# Patient Record
Sex: FEMALE
[De-identification: names, ages, dates, MRNs, and addresses within clinical notes are randomized per-mention and may not be internally consistent; named-entity substitution may affect disease eponyms.]

---

## 2019-03-21 ENCOUNTER — HOSPITAL ENCOUNTER (OUTPATIENT)
Dept: HOSPITAL 31 - C.ER | Age: 74
Discharge: HOME | End: 2019-03-21
Attending: INTERNAL MEDICINE
Payer: MEDICARE

## 2019-03-21 VITALS
DIASTOLIC BLOOD PRESSURE: 62 MMHG | TEMPERATURE: 97.7 F | OXYGEN SATURATION: 99 % | HEART RATE: 76 BPM | SYSTOLIC BLOOD PRESSURE: 153 MMHG | RESPIRATION RATE: 14 BRPM

## 2019-03-21 VITALS — BODY MASS INDEX: 28.3 KG/M2

## 2019-03-21 DIAGNOSIS — K29.00: ICD-10-CM

## 2019-03-21 DIAGNOSIS — E78.00: ICD-10-CM

## 2019-03-21 DIAGNOSIS — R07.0: ICD-10-CM

## 2019-03-21 DIAGNOSIS — K44.9: ICD-10-CM

## 2019-03-21 DIAGNOSIS — Z90.710: ICD-10-CM

## 2019-03-21 DIAGNOSIS — B96.81: ICD-10-CM

## 2019-03-21 DIAGNOSIS — E11.9: ICD-10-CM

## 2019-03-21 DIAGNOSIS — Z82.49: ICD-10-CM

## 2019-03-21 DIAGNOSIS — Z79.84: ICD-10-CM

## 2019-03-21 DIAGNOSIS — Z83.3: ICD-10-CM

## 2019-03-21 DIAGNOSIS — K21.0: Primary | ICD-10-CM

## 2019-03-21 DIAGNOSIS — Z82.0: ICD-10-CM

## 2019-03-21 DIAGNOSIS — I10: ICD-10-CM

## 2019-03-21 LAB
ALBUMIN SERPL-MCNC: 4.4 G/DL (ref 3.5–5)
ALBUMIN/GLOB SERPL: 1.7 {RATIO} (ref 1–2.1)
ALT SERPL-CCNC: 14 U/L (ref 9–52)
APTT BLD: 35 SECONDS (ref 21–34)
AST SERPL-CCNC: 24 U/L (ref 14–36)
BASOPHILS # BLD AUTO: 0.1 K/UL (ref 0–0.2)
BASOPHILS NFR BLD: 0.7 % (ref 0–2)
BUN SERPL-MCNC: 13 MG/DL (ref 7–17)
CALCIUM SERPL-MCNC: 10.1 MG/DL (ref 8.6–10.4)
EOSINOPHIL # BLD AUTO: 0.3 K/UL (ref 0–0.7)
EOSINOPHIL NFR BLD: 4.1 % (ref 0–4)
ERYTHROCYTE [DISTWIDTH] IN BLOOD BY AUTOMATED COUNT: 13.7 % (ref 11.5–14.5)
GFR NON-AFRICAN AMERICAN: > 60
HGB BLD-MCNC: 11.9 G/DL (ref 11–16)
INR PPP: 1
LYMPHOCYTES # BLD AUTO: 1.7 K/UL (ref 1–4.3)
LYMPHOCYTES NFR BLD AUTO: 24.1 % (ref 20–40)
MCH RBC QN AUTO: 30.5 PG (ref 27–31)
MCHC RBC AUTO-ENTMCNC: 33 G/DL (ref 33–37)
MCV RBC AUTO: 92.3 FL (ref 81–99)
MONOCYTES # BLD: 0.5 K/UL (ref 0–0.8)
MONOCYTES NFR BLD: 7.1 % (ref 0–10)
NEUTROPHILS # BLD: 4.6 K/UL (ref 1.8–7)
NEUTROPHILS NFR BLD AUTO: 64 % (ref 50–75)
NRBC BLD AUTO-RTO: 0 % (ref 0–2)
PLATELET # BLD: 207 K/UL (ref 130–400)
PMV BLD AUTO: 8.2 FL (ref 7.2–11.7)
PROTHROMBIN TIME: 11.2 SECONDS (ref 9.7–12.2)
RBC # BLD AUTO: 3.91 MIL/UL (ref 3.8–5.2)
WBC # BLD AUTO: 7.2 K/UL (ref 4.8–10.8)

## 2019-03-21 PROCEDURE — 80053 COMPREHEN METABOLIC PANEL: CPT

## 2019-03-21 PROCEDURE — 43239 EGD BIOPSY SINGLE/MULTIPLE: CPT

## 2019-03-21 PROCEDURE — 88305 TISSUE EXAM BY PATHOLOGIST: CPT

## 2019-03-21 PROCEDURE — 71045 X-RAY EXAM CHEST 1 VIEW: CPT

## 2019-03-21 PROCEDURE — 88342 IMHCHEM/IMCYTCHM 1ST ANTB: CPT

## 2019-03-21 PROCEDURE — 82948 REAGENT STRIP/BLOOD GLUCOSE: CPT

## 2019-03-21 PROCEDURE — 70490 CT SOFT TISSUE NECK W/O DYE: CPT

## 2019-03-21 PROCEDURE — 88312 SPECIAL STAINS GROUP 1: CPT

## 2019-03-21 PROCEDURE — 36415 COLL VENOUS BLD VENIPUNCTURE: CPT

## 2019-03-21 PROCEDURE — 85025 COMPLETE CBC W/AUTO DIFF WBC: CPT

## 2019-03-21 PROCEDURE — 88313 SPECIAL STAINS GROUP 2: CPT

## 2019-03-21 PROCEDURE — 85610 PROTHROMBIN TIME: CPT

## 2019-03-21 PROCEDURE — 85730 THROMBOPLASTIN TIME PARTIAL: CPT

## 2019-03-21 NOTE — CP.PCM.PN
Subjective





- Date & Time of Evaluation


Date of Evaluation: 03/21/19


Time of Evaluation: 17:55





- Subjective


Subjective: 





Brief EGD NOte:





No foreign body or inflammatory reaction anywhere in the upper esophagus.


Reflux esophagitis and small hiatal hernia


Pre-pyloric gastritis with superficial erosions





Check biopsies


PPI for 14 days po


ENT follow up as out patient if symptoms persist. 


No relation to fish bone ingestion (no inflammation noted six days after 

ingestion would be highly unlikely)


Stable for discharge this evening if able to tolerate soft diet. 








Objective





- Vital Signs/Intake and Output


Vital Signs (last 24 hours): 


                                        











Temp Pulse Resp BP Pulse Ox


 


 97.7 F   77   18   146/78   100 


 


 03/21/19 16:17  03/21/19 17:25  03/21/19 17:25  03/21/19 17:25  03/21/19 17:25











- Medications


Medications: 


                               Current Medications





Amlodipine Besylate (Norvasc)  10 mg PO DAILY [facility]


Aspirin (Aspirin Chewable)  81 mg PO DAILY [facility]


Dextrose (Dextrose 50% Inj)  50 ml IV STAT PRN; Protocol


   PRN Reason: Hypoglycemia Protocol


Dextrose (Glutose 15)  0 gm PO ONCE PRN; Protocol


   PRN Reason: Hypoglycemia Protocol


Glucagon (Glucagen Diagnostic Kit)  0 mg IM STAT PRN; Protocol


   PRN Reason: Hypoglycemia Protocol


Dextrose (Dextrose 5% In Water 1000 Ml)  1,000 mls @ 0 mls/hr IV .Q0M PRN; 

Protocol


   PRN Reason: Hypoglycemia Protocol


Insulin Human Regular (Novolin R)  0 unit SC Q6H [facility]; Protocol


   Last Admin: 03/21/19 17:22 Dose:  Not Given


Metformin HCl (Glucophage)  850 mg PO BIDCC [facility]











- Labs


Labs: 


                                        





                                 03/21/19 14:33 





                                 03/21/19 14:33 





                                        











PT  11.2 SECONDS (9.7-12.2)   03/21/19  14:33    


 


INR  1.0   03/21/19  14:33    


 


APTT  35 SECONDS (21-34)  H  03/21/19  14:33

## 2019-03-21 NOTE — RAD
Date of service: 



03/21/2019



PROCEDURE:  CHEST RADIOGRAPH, 1 VIEW



HISTORY:

SOB



COMPARISON:

8/7/2013



FINDINGS:



LUNGS:

Clear.



PLEURA:

No pneumothorax or pleural fluid seen.



CARDIOVASCULAR:

 There is atherosclerotic calcification of the thoracic aorta.  

Normal heart size.  No congestive change.  Please note that there is 

no radiopaque foreign body seen along the tracheal bronchial tree.



OSSEOUS STRUCTURES:

No significant abnormalities.



VISUALIZED UPPER ABDOMEN:

Normal.



OTHER FINDINGS:

None. 



IMPRESSION:

No radiopaque foreign body identified.

## 2019-03-21 NOTE — CP.PCM.HP
History of Present Illness





- History of Present Illness


History of Present Illness: 





HPI: Patient is a 74 year old female with PMH of Diabetes and HTN presenting 

with throat pain. Patient was eating fish on Friday night (3/15/19) and felt 

like a fish bone got stuck in her throat. Patient tried eating more food to try 

and help push it down with little relief. Patient said she is still able to 

eat/swallow but the discomfort and pain has been getting worse. Patient feels 

like something cut her and there is a burning and poking sensation. Patient saw 

Dr. Li yesterday who referred her to see Dr. Behin. Dr. Behin saw patient 

today and did not find anything on exam and believes that the bone is lower that

the laryngoscope could see. Patient had tea this morning and her medications. 

Patient has not eaten anything today. 


Patient denies headache, chest pain, shortness of breath, abdominal pain, 

nausea, vomiting, constipation, or diarrhea. 





Cardio: Dr. Carrasco


All:NKDA


PMHx: DMII, HTN


Family History- Mom:  of MI at 84, Dad: DMII, Parkinson's, Alzheimer's


Social- denies tobacco, alcohol, illicit drug use, lives with 


Surgical History- hysterectomy 2013, R breast cyst drainage/removal 2018











Present on Admission





- Present on Admission


Any Indicators Present on Admission: No


History of DVT/PE: No


History of Uncontrolled Diabetes: No


Urinary Catheter: No


Decubitus Ulcer Present: No





Review of Systems





- Constitutional


Constitutional: absent: Fever, Headache





- EENT


Nose/Mouth/Throat: Odynophagia, Sore Throat.  absent: Hoarsness





- Cardiovascular


Cardiovascular: absent: Chest Pain, Dyspnea





- Respiratory


Respiratory: absent: Cough, Stridor





- Gastrointestinal


Gastrointestinal: absent: Abdominal Pain, Nausea, Vomiting





- Integumentary


Integumentary: absent: Rash





- Neurological


Neurological: absent: Dizziness





Past Patient History





- Past Social History


Smoking Status: Never Smoked





- CARDIAC


Hx Hypercholesterolemia: Yes


Hx Hypertension: Yes





- ENDOCRINE/METABOLIC


Hx Diabetes Mellitus Type 2: Yes





- PSYCHIATRIC


Hx Substance Use: No





- SURGICAL HISTORY


Hx Hysterectomy: Yes (Partial)





- ANESTHESIA


Hx Anesthesia: Yes


Hx Anesthesia Reactions: No





Meds


Allergies/Adverse Reactions: 


                                    Allergies











Allergy/AdvReac Type Severity Reaction Status Date / Time


 


No Known Allergies Allergy   Verified 19 11:59














Physical Exam





- Constitutional


Appears: Non-toxic, No Acute Distress





- Head Exam


Head Exam: ATRAUMATIC, NORMAL INSPECTION, NORMOCEPHALIC





- Eye Exam


Eye Exam: EOMI, Normal appearance





- ENT Exam


ENT Exam: Mucous Membranes Moist





- Neck Exam


Neck exam: Positive for: Normal Inspection





- Respiratory Exam


Respiratory Exam: Clear to Auscultation Bilateral, NORMAL BREATHING PATTERN.  

absent: Rales, Rhonchi, Wheezes, Respiratory Distress, Stridor





- Cardiovascular Exam


Cardiovascular Exam: REGULAR RHYTHM, RRR, +S1, +S2





- GI/Abdominal Exam


GI & Abdominal Exam: Normal Bowel Sounds, Soft





- Extremities Exam


Extremities exam: Positive for: normal inspection.  Negative for: tenderness





- Neurological Exam


Neurological exam: Alert, Oriented x3





- Psychiatric Exam


Psychiatric exam: Normal Affect, Normal Mood





- Skin


Skin Exam: Intact, Normal Color, Warm





Results





- Vital Signs


Recent Vital Signs: 





                                Last Vital Signs











Temp  98.5 F   19 11:55


 


Pulse  92 H  19 11:55


 


Resp  20   19 11:55


 


BP  156/76 H  19 11:55


 


Pulse Ox  99   19 11:55














- Labs


Result Diagrams: 


                                 19 14:33





                                 19 14:33





Assessment & Plan





- Assessment and Plan (Free Text)


Assessment: 





Throat Pain 


2/2 foreign body?


NPO


EGD today for evaluation 


normal laryngoscope today with Dr. Behin





HTN


continue Amlodipine 10mg po daily


ASA 81mg po daily





DMII


Metformin 850mg po BID





Prophylaxis


SCDs








Dispo: possible d/c pending endoscopic evaluation





Discussed with Dr. Li

## 2019-03-21 NOTE — CP.PCM.CON
<Heaven Packer - Last Filed: 03/21/19 15:31>





History of Present Illness





- History of Present Illness


History of Present Illness: 


Gastroenterology Fellow/PGY6 Consult Note for 





74 year old female with PMH of Diabetes and HTN presenting with throat pain. 

Patient describes immediate mid-epigastric throat pain after fish ingestion on 

friday. She attempted to drink water which resulted in approximately fifty-

percent relief. Endorses constant dull throat discomfort since friday at level 

of clavicles. Denies heartburn, acid reflux, chest pain, shortness of breath, 

abdominal pain, diarrhea, constipation, melena, hematochezia, or unintentional 

weight loss. She last ate solid food yesterday for dinner of salad with soup 

containing vegetables and no meat products. Last oral intake today of eight 

ounces of coffee this morning with her home medications. She was referred to ENT

by PCP outpatient with a laryngoscope performed within the last five days that 

was normal. No prior EGD or colonoscopy.





Family History- denies stomach cancer, colon cancer


Social History- denies tobacco, alcohol, illicit drug use


Surgical History- hysterectomy 2013








Review of Systems





- Review of Systems


Review of Systems: 


12-point review of systems negative except for as above








Past Patient History





- Past Social History


Smoking Status: Never Smoked





- CARDIAC


Hx Hypercholesterolemia: Yes


Hx Hypertension: Yes





- ENDOCRINE/METABOLIC


Hx Diabetes Mellitus Type 2: Yes





- PSYCHIATRIC


Hx Substance Use: No





- SURGICAL HISTORY


Hx Hysterectomy: Yes (Partial)





- ANESTHESIA


Hx Anesthesia: Yes


Hx Anesthesia Reactions: No





Meds


Allergies/Adverse Reactions: 


                                    Allergies











Allergy/AdvReac Type Severity Reaction Status Date / Time


 


No Known Allergies Allergy   Verified 03/21/19 11:59














Physical Exam





- Constitutional


Appears: Non-toxic, No Acute Distress





- Head Exam


Head Exam: ATRAUMATIC, NORMOCEPHALIC





- Eye Exam


Eye Exam: EOMI, PERRL.  absent: Scleral icterus


Pupil Exam: PERRL.  absent: Miosis, Mydriatic





- ENT Exam


ENT Exam: Mucous Membranes Moist, Normal Oropharynx





- Neck Exam


Neck exam: Positive for: Full Rom, Normal Inspection





- Respiratory Exam


Respiratory Exam: Clear to Auscultation Bilateral.  absent: Rales, Rhonchi, 

Wheezes





- Cardiovascular Exam


Cardiovascular Exam: RRR, +S1, +S2.  absent: Gallop, Rubs





- GI/Abdominal Exam


GI & Abdominal Exam: Normal Bowel Sounds, Soft.  absent: Distended, Firm, 

Guarding, Organomegaly, Rebound, Rigid, Tenderness





- Extremities Exam


Extremities exam: Positive for: normal inspection.  Negative for: pedal edema





- Neurological Exam


Neurological exam: Alert, Oriented x3





- Psychiatric Exam


Psychiatric exam: Normal Affect, Normal Mood





- Skin


Skin Exam: Dry, Intact, Normal Color, Warm





Results





- Vital Signs


Recent Vital Signs: 


                                Last Vital Signs











Temp  98.5 F   03/21/19 11:55


 


Pulse  92 H  03/21/19 11:55


 


Resp  20   03/21/19 11:55


 


BP  156/76 H  03/21/19 11:55


 


Pulse Ox  99   03/21/19 11:55














- Labs


Result Diagrams: 


                                 03/21/19 14:33





                                 03/21/19 14:33





Assessment & Plan





- Assessment and Plan (Free Text)


Assessment: 


74 year old female with PMH of Diabetes and HTN presenting with throat pain. 

Active treatment of throat pain with concern for foreign body after fish bone 

ingestion seven days ago. No prior EGD or colonoscopy.





Plan: 





-plan for EGD today to evaluate for foreign body


-NPO status


-hemodynamically stable


-last oral intake- eight ounces of coffee this morning with her home medications


-recent outpatient ENT evaluation- normal laryngoscope in last five days


-further recommendations after endoscopic evaluation








<Doc Hill - Last Filed: 03/21/19 17:51>





Meds





- Medications


Medications: 


                               Current Medications





Amlodipine Besylate (Norvasc)  10 mg PO DAILY [facility]


Aspirin (Aspirin Chewable)  81 mg PO DAILY [facility]


Dextrose (Dextrose 50% Inj)  50 ml IV STAT PRN; Protocol


   PRN Reason: Hypoglycemia Protocol


Dextrose (Glutose 15)  0 gm PO ONCE PRN; Protocol


   PRN Reason: Hypoglycemia Protocol


Glucagon (Glucagen Diagnostic Kit)  0 mg IM STAT PRN; Protocol


   PRN Reason: Hypoglycemia Protocol


Dextrose (Dextrose 5% In Water 1000 Ml)  1,000 mls @ 0 mls/hr IV .Q0M PRN; 

Protocol


   PRN Reason: Hypoglycemia Protocol


Insulin Human Regular (Novolin R)  0 unit SC Q6H [facility]; Protocol


   Last Admin: 03/21/19 17:22 Dose:  Not Given


Metformin HCl (Glucophage)  850 mg PO BIDCC [facility]











Results





- Vital Signs


Recent Vital Signs: 


                                Last Vital Signs











Temp  97.7 F   03/21/19 16:17


 


Pulse  74   03/21/19 16:17


 


Resp  20   03/21/19 16:17


 


BP  150/69   03/21/19 16:17


 


Pulse Ox  97   03/21/19 16:35














- Labs


Result Diagrams: 


                                 03/21/19 14:33





                                 03/21/19 14:33


Labs: 


                         Laboratory Results - last 24 hr











  03/21/19 03/21/19 03/21/19





  14:33 14:33 14:33


 


WBC  7.2  


 


RBC  3.91  


 


Hgb  11.9  


 


Hct  36.1  


 


MCV  92.3  D  


 


MCH  30.5  


 


MCHC  33.0  


 


RDW  13.7  


 


Plt Count  207  


 


MPV  8.2  


 


Neut % (Auto)  64.0  


 


Lymph % (Auto)  24.1  


 


Mono % (Auto)  7.1  


 


Eos % (Auto)  4.1 H  


 


Baso % (Auto)  0.7  


 


Neut # (Auto)  4.6  


 


Lymph # (Auto)  1.7  


 


Mono # (Auto)  0.5  


 


Eos # (Auto)  0.3  


 


Baso # (Auto)  0.1  


 


PT   11.2 


 


INR   1.0 


 


APTT   35 H 


 


Sodium    137


 


Potassium    4.2


 


Chloride    102


 


Carbon Dioxide    29


 


Anion Gap    11


 


BUN    13


 


Creatinine    0.7


 


Est GFR ( Amer)    > 60


 


Est GFR (Non-Af Amer)    > 60


 


POC Glucose (mg/dL)   


 


Random Glucose    148 H


 


Calcium    10.1


 


Total Bilirubin    0.3


 


AST    24


 


ALT    14


 


Alkaline Phosphatase    107


 


Total Protein    7.0


 


Albumin    4.4


 


Globulin    2.6


 


Albumin/Globulin Ratio    1.7














  03/21/19





  15:53


 


WBC 


 


RBC 


 


Hgb 


 


Hct 


 


MCV 


 


MCH 


 


MCHC 


 


RDW 


 


Plt Count 


 


MPV 


 


Neut % (Auto) 


 


Lymph % (Auto) 


 


Mono % (Auto) 


 


Eos % (Auto) 


 


Baso % (Auto) 


 


Neut # (Auto) 


 


Lymph # (Auto) 


 


Mono # (Auto) 


 


Eos # (Auto) 


 


Baso # (Auto) 


 


PT 


 


INR 


 


APTT 


 


Sodium 


 


Potassium 


 


Chloride 


 


Carbon Dioxide 


 


Anion Gap 


 


BUN 


 


Creatinine 


 


Est GFR ( Amer) 


 


Est GFR (Non-Af Amer) 


 


POC Glucose (mg/dL)  137 H


 


Random Glucose 


 


Calcium 


 


Total Bilirubin 


 


AST 


 


ALT 


 


Alkaline Phosphatase 


 


Total Protein 


 


Albumin 


 


Globulin 


 


Albumin/Globulin Ratio 














Attending/Attestation





- Attestation


I have personally seen and examined this patient.: Yes


I have fully participated in the care of the patient.: Yes


I have reviewed all pertinent clinical information: Yes


Notes (Text): 





03/21/19 17:49


CT scan of the throat shows no evidence of foreign body or inflammatory reaction

that would be expected after six days if embedded in tissue for this period of 

time. 


Plan for emergency EGD today to assess for foreign body.


IV fluids and PPI.

## 2019-03-21 NOTE — CP.PCM.DIS
<Atul Delatorre E - Last Filed: 03/21/19 15:38>





Provider





- Provider


Date of Admission: 


03/21/19 14:06





Attending physician: 


Farhad Li Jr, MD





Consults: 








03/21/19 14:41


Gastroenterology Consult Routine 


   Comment: esophagus FB


   Consulting Provider: Doc Hill


   Consulting Physician: Doc Hill


   Reason for Consult: esophagus FB














Hospital Course





- Lab Results


Lab Results: 


                             Most Recent Lab Values











WBC  7.2 K/uL (4.8-10.8)   03/21/19  14:33    


 


RBC  3.91 Mil/uL (3.80-5.20)   03/21/19  14:33    


 


Hgb  11.9 g/dL (11.0-16.0)   03/21/19  14:33    


 


Hct  36.1 % (34.0-47.0)   03/21/19  14:33    


 


MCV  92.3 fL (81.0-99.0)  D 03/21/19  14:33    


 


MCH  30.5 pg (27.0-31.0)   03/21/19  14:33    


 


MCHC  33.0 g/dL (33.0-37.0)   03/21/19  14:33    


 


RDW  13.7 % (11.5-14.5)   03/21/19  14:33    


 


Plt Count  207 K/uL (130-400)   03/21/19  14:33    


 


MPV  8.2 fL (7.2-11.7)   03/21/19  14:33    


 


Neut % (Auto)  64.0 % (50.0-75.0)   03/21/19  14:33    


 


Lymph % (Auto)  24.1 % (20.0-40.0)   03/21/19  14:33    


 


Mono % (Auto)  7.1 % (0.0-10.0)   03/21/19  14:33    


 


Eos % (Auto)  4.1 % (0.0-4.0)  H  03/21/19  14:33    


 


Baso % (Auto)  0.7 % (0.0-2.0)   03/21/19  14:33    


 


Neut # (Auto)  4.6 K/uL (1.8-7.0)   03/21/19  14:33    


 


Lymph # (Auto)  1.7 K/uL (1.0-4.3)   03/21/19  14:33    


 


Mono # (Auto)  0.5 K/uL (0.0-0.8)   03/21/19  14:33    


 


Eos # (Auto)  0.3 K/uL (0.0-0.7)   03/21/19  14:33    


 


Baso # (Auto)  0.1 K/uL (0.0-0.2)   03/21/19  14:33    


 


PT  11.2 SECONDS (9.7-12.2)   03/21/19  14:33    


 


INR  1.0   03/21/19  14:33    


 


APTT  35 SECONDS (21-34)  H  03/21/19  14:33    


 


Sodium  137 mmol/L (132-148)   03/21/19  14:33    


 


Potassium  4.2 mmol/L (3.6-5.2)   03/21/19  14:33    


 


Chloride  102 mmol/L ()   03/21/19  14:33    


 


Carbon Dioxide  29 mmol/L (22-30)   03/21/19  14:33    


 


Anion Gap  11  (10-20)   03/21/19  14:33    


 


BUN  13 mg/dL (7-17)   03/21/19  14:33    


 


Creatinine  0.7 mg/dL (0.7-1.2)   03/21/19  14:33    


 


Est GFR ( Amer)  > 60   03/21/19  14:33    


 


Est GFR (Non-Af Amer)  > 60   03/21/19  14:33    


 


Random Glucose  148 mg/dL ()  H  03/21/19  14:33    


 


Calcium  10.1 mg/dl (8.6-10.4)   03/21/19  14:33    


 


Total Bilirubin  0.3 mg/dL (0.2-1.3)   03/21/19  14:33    


 


AST  24 U/L (14-36)   03/21/19  14:33    


 


ALT  14 U/L (9-52)   03/21/19  14:33    


 


Alkaline Phosphatase  107 U/L ()   03/21/19  14:33    


 


Total Protein  7.0 g/dL (6.3-8.3)   03/21/19  14:33    


 


Albumin  4.4 g/dL (3.5-5.0)   03/21/19  14:33    


 


Globulin  2.6 gm/dL (2.2-3.9)   03/21/19  14:33    


 


Albumin/Globulin Ratio  1.7  (1.0-2.1)   03/21/19  14:33    














Discharge Plan





- Discharge Medications


Prescriptions: 


Famotidine [Pepcid] 20 mg PO DAILY #7 tab





- Follow Up Plan


Condition: GOOD


Disposition: HOME/ ROUTINE


Instructions:  Foreign Body, Swallowed, Adult (DC)


Additional Instructions: 


Patient had endoscopy performed by Dr. Hill


fishbone was removed from esophagus





Patient stable for discharge. 





Please follow up with Dr Hill in the office





If symptoms return, please seek medical attention


take care and be well


Referrals: 


Doc Hill MD [Staff Provider] - 





<Natalya Zuleta - Last Filed: 03/22/19 15:56>





Provider





- Provider


Date of Admission: 


03/21/19 14:06





Attending physician: 


Farhad Li Jr, MD





Consults: 








03/21/19 14:41


Gastroenterology Consult Routine 


   Comment: esophagus FB


   Consulting Provider: Doc Hill


   Consulting Physician: Doc Hill


   Reason for Consult: esophagus FB











Time Spent in preparation of Discharge (in minutes): 35





Diagnosis





- Discharge Diagnosis


(1) Reflux esophagitis


Status: Acute   





(2) Hiatal hernia


Status: Acute   





Hospital Course





- Lab Results


Lab Results: 


                             Most Recent Lab Values











WBC  7.2 K/uL (4.8-10.8)   03/21/19  14:33    


 


RBC  3.91 Mil/uL (3.80-5.20)   03/21/19  14:33    


 


Hgb  11.9 g/dL (11.0-16.0)   03/21/19  14:33    


 


Hct  36.1 % (34.0-47.0)   03/21/19  14:33    


 


MCV  92.3 fL (81.0-99.0)  D 03/21/19  14:33    


 


MCH  30.5 pg (27.0-31.0)   03/21/19  14:33    


 


MCHC  33.0 g/dL (33.0-37.0)   03/21/19  14:33    


 


RDW  13.7 % (11.5-14.5)   03/21/19  14:33    


 


Plt Count  207 K/uL (130-400)   03/21/19  14:33    


 


MPV  8.2 fL (7.2-11.7)   03/21/19  14:33    


 


Neut % (Auto)  64.0 % (50.0-75.0)   03/21/19  14:33    


 


Lymph % (Auto)  24.1 % (20.0-40.0)   03/21/19  14:33    


 


Mono % (Auto)  7.1 % (0.0-10.0)   03/21/19  14:33    


 


Eos % (Auto)  4.1 % (0.0-4.0)  H  03/21/19  14:33    


 


Baso % (Auto)  0.7 % (0.0-2.0)   03/21/19  14:33    


 


Neut # (Auto)  4.6 K/uL (1.8-7.0)   03/21/19  14:33    


 


Lymph # (Auto)  1.7 K/uL (1.0-4.3)   03/21/19  14:33    


 


Mono # (Auto)  0.5 K/uL (0.0-0.8)   03/21/19  14:33    


 


Eos # (Auto)  0.3 K/uL (0.0-0.7)   03/21/19  14:33    


 


Baso # (Auto)  0.1 K/uL (0.0-0.2)   03/21/19  14:33    


 


PT  11.2 SECONDS (9.7-12.2)   03/21/19  14:33    


 


INR  1.0   03/21/19  14:33    


 


APTT  35 SECONDS (21-34)  H  03/21/19  14:33    


 


Sodium  137 mmol/L (132-148)   03/21/19  14:33    


 


Potassium  4.2 mmol/L (3.6-5.2)   03/21/19  14:33    


 


Chloride  102 mmol/L ()   03/21/19  14:33    


 


Carbon Dioxide  29 mmol/L (22-30)   03/21/19  14:33    


 


Anion Gap  11  (10-20)   03/21/19  14:33    


 


BUN  13 mg/dL (7-17)   03/21/19  14:33    


 


Creatinine  0.7 mg/dL (0.7-1.2)   03/21/19  14:33    


 


Est GFR ( Amer)  > 60   03/21/19  14:33    


 


Est GFR (Non-Af Amer)  > 60   03/21/19  14:33    


 


Random Glucose  148 mg/dL ()  H  03/21/19  14:33    


 


Calcium  10.1 mg/dl (8.6-10.4)   03/21/19  14:33    


 


Total Bilirubin  0.3 mg/dL (0.2-1.3)   03/21/19  14:33    


 


AST  24 U/L (14-36)   03/21/19  14:33    


 


ALT  14 U/L (9-52)   03/21/19  14:33    


 


Alkaline Phosphatase  107 U/L ()   03/21/19  14:33    


 


Total Protein  7.0 g/dL (6.3-8.3)   03/21/19  14:33    


 


Albumin  4.4 g/dL (3.5-5.0)   03/21/19  14:33    


 


Globulin  2.6 gm/dL (2.2-3.9)   03/21/19  14:33    


 


Albumin/Globulin Ratio  1.7  (1.0-2.1)   03/21/19  14:33    














- Hospital Course


Hospital Course: 





"HPI: Patient is a 74 year old female with PMH of Diabetes and HTN presenting 

with throat pain. Patient was eating fish on Friday night (3/15/19) and felt li

ke a fish bone got stuck in her throat. Patient tried eating more food to try 

and help push it down with little relief. Patient said she is still able to 

eat/swallow but the discomfort and pain has been getting worse. Patient feels 

like something cut her and there is a burning and poking sensation. Patient saw 

Dr. Li yesterday who referred her to see Dr. Behin. Dr. Behin saw patient 

today and did not find anything on exam and believes that the bone is lower that

the laryngoscope could see. Patient had tea this morning and her medications. 

Patient has not eaten anything today. 


Patient denies headache, chest pain, shortness of breath, abdominal pain, 

nausea, vomiting, constipation, or diarrhea."





Patient had endoscopy performed by Dr. Hill. No foreign body or inflammatory 

reaction seen. Reflux esophagitis and small hiatal hernia seen. 


Pre-pyloric gastritis with superficial erosions and biopsy done to rule out H 

pylori. 





Patient stable for discharge. Patient to follow up with Dr. Li as an 

outpatient and to follow up with GI in 2 weeks if symptoms persist. 





This is a summary of the patient's hospital course, please see chart for full 

details. 





Discharge Exam





- Head Exam


Head Exam: ATRAUMATIC, NORMAL INSPECTION, NORMOCEPHALIC





- Eye Exam


Eye Exam: EOMI, Normal appearance





- ENT Exam


ENT Exam: Mucous Membranes Moist





- Neck Exam


Neck exam: Normal Inspection





- Respiratory Exam


Respiratory Exam: Clear to PA & Lateral, NORMAL BREATHING PATTERN, UNREMARKABLE.

 absent: Rhonchi, Wheezes, Respiratory Distress, Stridor





- Cardiovascular Exam


Cardiovascular Exam: REGULAR RHYTHM, RRR, +S1, +S2





- GI/Abdominal Exam


GI & Abdominal Exam: Normal Bowel Sounds, Soft.  absent: Tenderness





- Extremities Exam


Extremities exam: normal inspection





- Neurological Exam


Neurological exam: Alert, Oriented x3





- Psychiatric Exam


Psychiatric exam: Normal Affect, Normal Mood





- Skin


Skin Exam: Intact, Normal Color, Warm

## 2019-03-21 NOTE — CT
Date of service: 



03/21/2019



PROCEDURE:  CT NECK WITHOUT  CONTRAST



HISTORY:

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



COMPARISON:

No prior study available for comparison.



TECHNIQUE:

CT of the neck without intravenous contrast. Coronal and sagittal 

reformats generated. 



Radiation dose:



Total exam DLP = 327.87 mGy-cm.



This CT exam was performed using one or more of the following dose 

reduction techniques: Automated exposure control, adjustment of the 

mA and/or kV according to patient size, and/or use of iterative 

reconstruction technique.



FINDINGS:



NASOPHARYNX:

Unremarkable.



SUPRAHYOID NECK:

Unremarkable oropharynx, oral cavity, parapharyngeal space and 

retropharyngeal space.



INFRAHYOID NECK:

Unremarkable larynx, hypopharynx, and supraglottic space. Vocal cords 

intact.



MASS:

None.



GLANDS:

Parotid and submandibular glands unremarkable.  There is a small 

calcification right lobe thyroid gland.  There is a large 

low-attenuation lesion left lobe thyroid gland for which thyroid 

ultrasound follow-up recommended.  



LYMPH NODES:

Normal. No lymphadenopathy.



CERVICAL SPINE:

Mild multilevel degenerative spondylosis of the thoracic spine. 



OTHER FINDINGS:

Mild calcified atherosclerotic plaque changes both carotid 

bifurcations.



No radiopaque foreign bodies are identified.



IMPRESSION:

There are no radiopaque foreign bodies identified.. 



Small calcification right lobe thyroid gland with low-attenuation 

lesion left lobe of the thyroid gland.  Recommend follow-up thyroid 

ultrasound.

## 2019-03-22 ENCOUNTER — HOSPITAL ENCOUNTER (EMERGENCY)
Dept: HOSPITAL 14 - H.ER | Age: 74
Discharge: HOME | End: 2019-03-22
Payer: MEDICARE

## 2019-03-22 VITALS
RESPIRATION RATE: 18 BRPM | SYSTOLIC BLOOD PRESSURE: 134 MMHG | HEART RATE: 87 BPM | TEMPERATURE: 98.6 F | OXYGEN SATURATION: 96 % | DIASTOLIC BLOOD PRESSURE: 68 MMHG

## 2019-03-22 VITALS — BODY MASS INDEX: 28.3 KG/M2

## 2019-03-22 DIAGNOSIS — J02.9: Primary | ICD-10-CM

## 2019-03-22 DIAGNOSIS — Z79.84: ICD-10-CM

## 2019-03-22 DIAGNOSIS — E11.9: ICD-10-CM

## 2019-03-22 NOTE — ED PDOC
HPI: General Adult


Time Seen by Provider: 03/22/19 17:59


Chief Complaint (Nursing): ENT Problem


Chief Complaint (Provider): Sore throat


History Per: Patient


History/Exam Limitations: no limitations


Onset/Duration Of Symptoms: Days (9)


Current Symptoms Are (Timing): Still Present


Additional Complaint(s): 


74 year old female presents to the ED for an evaluation of sore throat.  Patient

reports of throat discomfort onset for 9 days after eating a fish and possibly 

swallowing a fish bone. She states that the foreign body sensation and 

discomfort was persistent and she followed up with her PMD Dr. Galvan the 

following day and he referred her to ENT.   She visited Dr. Behin 3 days ago and

had a laryngoscopy performed in the office with no foreign body visualized and 

patient was referred to the ED for a CT scan. She was seen at [facility] 

yesterday and had a CT scan done which as negative and GI consult for EGD which 

was also negative.  Pt. reports persistent discomfort while swallowing, no 

fevers.  She is tolerating po.








Past Medical History


Reviewed: Historical Data, Nursing Documentation, Vital Signs


Vital Signs: 





                                Last Vital Signs











Temp  98.6 F   03/22/19 17:48


 


Pulse  87   03/22/19 17:48


 


Resp  18   03/22/19 17:48


 


BP  134/68   03/22/19 17:48


 


Pulse Ox  96   03/22/19 17:48














- Medical History


PMH: Diabetes, HTN, Hypercholesterolemia





- Family History


Family History: States: Unknown Family Hx





- Immunization History


Hx Tetanus Toxoid Vaccination: No


Hx Influenza Vaccination: Yes


Hx Pneumococcal Vaccination: Yes





- Home Medications


Home Medications: 


                                Ambulatory Orders











 Medication  Instructions  Recorded


 


Aspirin 81 mg PO DAILY 03/21/19


 


Famotidine [Pepcid] 20 mg PO DAILY #7 tab 03/21/19


 


amLODIPine [Norvasc] 10 mg PO DAILY 03/21/19


 


metFORMIN [glucOPHAGE] 850 mg PO BID 03/21/19


 


Sucralfate [Carafate] 1 gm PO BID #100 ml 03/22/19














- Allergies


Allergies/Adverse Reactions: 


                                    Allergies











Allergy/AdvReac Type Severity Reaction Status Date / Time


 


No Known Allergies Allergy   Verified 03/22/19 17:48














Review of Systems


ROS Statement: Except As Marked, All Systems Reviewed And Found Negative


ENT: Positive for: Throat Pain.  Negative for: Mouth Swelling, Throat Swelling


Musculoskeletal: Negative for: Neck Pain


Skin: Negative for: Rash





Physical Exam





- Reviewed


Nursing Documentation Reviewed: Yes


Vital Signs Reviewed: Yes





- Physical Exam


Appears: Positive for: Non-toxic, No Acute Distress


Head Exam: Positive for: ATRAUMATIC, NORMAL INSPECTION, NORMOCEPHALIC


Skin: Positive for: Normal Color, Warm, DRY


ENT: Positive for: Normal ENT Inspection (no sublingual swelling or elevation of

 tongue ), Pharyngeal Erythema (mild ), Other (no uvula enlargement, uvula 

midline, no trismus, no sublingual swelling, no elevation of tongue  )


Neck: Positive for: Normal (no swelling, erythema or tenderness to palpation 

over anterior neck or submandibular areas), Painless ROM ( k )


Cardiovascular/Chest: Positive for: Regular Rate, Rhythm.  Negative for: Murmur


Respiratory: Positive for: Normal Breath Sounds.  Negative for: Decreased Breath

 Sounds, Respiratory Distress


Neurological/Psych: Positive for: Awake, Alert, Normal Tone, Oriented (x3)





- ECG


O2 Sat by Pulse Oximetry: 96 (RA)


Pulse Ox Interpretation: Normal





Medical Decision Making


Medical Decision Making: 


Time: 1814


Plan: 


Lidocaine 2% 10ml PO





IMAGING FROM [facility] YESTERDAY.


Date of service: 03/21/2019


PROCEDURE:  CT NECK WITHOUT  CONTRAST


HISTORY:


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


COMPARISON:


No prior study available for comparison.


TECHNIQUE:


CT of the neck without intravenous contrast. Coronal and sagittal reformats 

generated. 


Radiation dose:


Total exam DLP = 327.87 mGy-cm.


This CT exam was performed using one or more of the following dose reduction 

techniques: Automated exposure control, adjustment of the mA and/or kV according

 to patient size, and/or use of iterative reconstruction technique.


FINDINGS:


NASOPHARYNX:


Unremarkable.


SUPRAHYOID NECK:


Unremarkable oropharynx, oral cavity, parapharyngeal space and retropharyngeal 

space.


INFRAHYOID NECK:


Unremarkable larynx, hypopharynx, and supraglottic space. Vocal cords intact.


MASS:


None.


GLANDS:


Parotid and submandibular glands unremarkable.  There is a small calcification 

right lobe thyroid gland.  There is a large low-attenuation lesion left lobe 

thyroid gland for which thyroid ultrasound follow-up recommended.  


LYMPH NODES:


Normal. No lymphadenopathy.


CERVICAL SPINE:


Mild multilevel degenerative spondylosis of the thoracic spine. 


OTHER FINDINGS:


Mild calcified atherosclerotic plaque changes both carotid bifurcations.


No radiopaque foreign bodies are identified.


IMPRESSION:


There are no radiopaque foreign bodies identified.. 


Small calcification right lobe thyroid gland with low-attenuation lesion left 

lobe of the thyroid gland.  Recommend follow-up thyroid ultrasound.





Date of service: 03/21/2019


PROCEDURE:  CHEST RADIOGRAPH, 1 VIEW


HISTORY:


SOB


COMPARISON:


8/7/2013


FINDINGS:


LUNGS:


Clear.


PLEURA:


No pneumothorax or pleural fluid seen.


CARDIOVASCULAR:


 There is atherosclerotic calcification of the thoracic aorta.  Normal heart 

size.  No congestive change.  Please note that there is no radiopaque foreign 

body seen along the tracheal bronchial tree.


OSSEOUS STRUCTURES:


No significant abnormalities.


VISUALIZED UPPER ABDOMEN:


Normal.


OTHER FINDINGS:


None. 


IMPRESSION:


No radiopaque foreign body identified.








On reassessment, pt. reports feeling much better, she is tolerating po.  





 

--------------------------------------------------------------------------------


-----------------------


Scribe Attestation:


Documented by Demetrice Ojeda acting as a scribe for Rashida Gonzalez PA-C.





Provider Scribe Attestation: 


All medical record entries made by the Scribe were at my direction and 

personally dictated by me. I have reviewed the chart and agree that the record 

accurately reflects my personal performance of the history, physical exam, 

medical decision making, and the department course for this patient. I have also

 personally directed, reviewed, and agree with the discharge instructions and 

disposition.











Disposition





- Clinical Impression


Clinical Impression: 


 Ear, nose, and throat symptom








- Patient ED Disposition


Is Patient to be Admitted: No





- Disposition


Referrals: 


Edy Acosta Jr., MD [Family Provider] - 


Disposition: Routine/Home


Disposition Time: 19:17


Condition: STABLE


Prescriptions: 


Sucralfate [Carafate] 1 gm PO BID #100 ml


Instructions:  Sore Throat, Adult (DC)


Forms:  [employer] (English), [employer] (Romansh)


Print Language: Citizen of [place]

## 2019-03-29 ENCOUNTER — HOSPITAL ENCOUNTER (OUTPATIENT)
Dept: HOSPITAL 31 - C.ER | Age: 74
Setting detail: OBSERVATION
LOS: 2 days | Discharge: HOME | End: 2019-03-31
Attending: INTERNAL MEDICINE | Admitting: INTERNAL MEDICINE
Payer: MEDICARE

## 2019-03-29 VITALS — RESPIRATION RATE: 20 BRPM

## 2019-03-29 VITALS — BODY MASS INDEX: 28.3 KG/M2

## 2019-03-29 DIAGNOSIS — J10.1: Primary | ICD-10-CM

## 2019-03-29 DIAGNOSIS — G20: ICD-10-CM

## 2019-03-29 DIAGNOSIS — I10: ICD-10-CM

## 2019-03-29 DIAGNOSIS — E11.9: ICD-10-CM

## 2019-03-29 DIAGNOSIS — N39.0: ICD-10-CM

## 2019-03-29 DIAGNOSIS — F02.80: ICD-10-CM

## 2019-03-29 LAB
ALBUMIN SERPL-MCNC: 4.5 G/DL (ref 3.5–5)
ALBUMIN/GLOB SERPL: 1.7 {RATIO} (ref 1–2.1)
ALT SERPL-CCNC: 17 U/L (ref 9–52)
AST SERPL-CCNC: 32 U/L (ref 14–36)
BACTERIA #/AREA URNS HPF: (no result) /[HPF]
BASOPHILS # BLD AUTO: 0 K/UL (ref 0–0.2)
BASOPHILS NFR BLD: 0.7 % (ref 0–2)
BILIRUB UR-MCNC: NEGATIVE MG/DL
BUN SERPL-MCNC: 15 MG/DL (ref 7–17)
CALCIUM SERPL-MCNC: 9.4 MG/DL (ref 8.6–10.4)
EOSINOPHIL # BLD AUTO: 0.1 K/UL (ref 0–0.7)
EOSINOPHIL NFR BLD: 0.9 % (ref 0–4)
EOSINOPHIL NFR BLD: 1 % (ref 0–4)
ERYTHROCYTE [DISTWIDTH] IN BLOOD BY AUTOMATED COUNT: 13.6 % (ref 11.5–14.5)
GFR NON-AFRICAN AMERICAN: > 60
GLUCOSE UR STRIP-MCNC: NORMAL MG/DL
HGB BLD-MCNC: 11.9 G/DL (ref 11–16)
LEUKOCYTE ESTERASE UR-ACNC: (no result) LEU/UL
LYMPHOCYTE: 6 % (ref 20–40)
LYMPHOCYTES # BLD AUTO: 0.5 K/UL (ref 1–4.3)
LYMPHOCYTES NFR BLD AUTO: 6.4 % (ref 20–40)
MCH RBC QN AUTO: 31.3 PG (ref 27–31)
MCHC RBC AUTO-ENTMCNC: 34.6 G/DL (ref 33–37)
MCV RBC AUTO: 90.5 FL (ref 81–99)
MONOCYTE: 10 % (ref 0–10)
MONOCYTES # BLD: 0.9 K/UL (ref 0–0.8)
MONOCYTES NFR BLD: 13.1 % (ref 0–10)
NEUTROPHILS # BLD: 5.6 K/UL (ref 1.8–7)
NEUTROPHILS NFR BLD AUTO: 78.9 % (ref 50–75)
NEUTROPHILS NFR BLD AUTO: 82 % (ref 50–75)
NEUTS BAND NFR BLD: 1 % (ref 0–2)
NRBC BLD AUTO-RTO: 0 % (ref 0–2)
OVALOCYTES BLD QL SMEAR: SLIGHT
PH UR STRIP: 5 [PH] (ref 5–8)
PLATELET # BLD EST: NORMAL 10*3/UL
PLATELET # BLD: 224 K/UL (ref 130–400)
PMV BLD AUTO: 7.7 FL (ref 7.2–11.7)
PROT UR STRIP-MCNC: (no result) MG/DL
RBC # BLD AUTO: 3.81 MIL/UL (ref 3.8–5.2)
RBC # UR STRIP: NEGATIVE /UL
SP GR UR STRIP: 1.01 (ref 1–1.03)
SQUAMOUS EPITHIAL: 1 /HPF (ref 0–5)
TOTAL CELLS COUNTED BLD: 100
UROBILINOGEN UR-MCNC: NORMAL MG/DL (ref 0.2–1)
WBC # BLD AUTO: 7.1 K/UL (ref 4.8–10.8)

## 2019-03-29 PROCEDURE — 80053 COMPREHEN METABOLIC PANEL: CPT

## 2019-03-29 PROCEDURE — 85025 COMPLETE CBC W/AUTO DIFF WBC: CPT

## 2019-03-29 PROCEDURE — 81001 URINALYSIS AUTO W/SCOPE: CPT

## 2019-03-29 PROCEDURE — 82948 REAGENT STRIP/BLOOD GLUCOSE: CPT

## 2019-03-29 PROCEDURE — 36415 COLL VENOUS BLD VENIPUNCTURE: CPT

## 2019-03-29 PROCEDURE — 87086 URINE CULTURE/COLONY COUNT: CPT

## 2019-03-29 PROCEDURE — 71046 X-RAY EXAM CHEST 2 VIEWS: CPT

## 2019-03-29 PROCEDURE — 87804 INFLUENZA ASSAY W/OPTIC: CPT

## 2019-03-29 PROCEDURE — 99285 EMERGENCY DEPT VISIT HI MDM: CPT

## 2019-03-29 NOTE — CP.PCM.HP
History of Present Illness





- History of Present Illness


History of Present Illness: 





Patient is a 74 year old female with pmhx of DM2 and HTN who presented to the ED

with complaints of fevers, weakness, coughing, sore throat that began last 

night.  Patient reports symptoms came on abruptly, and worsened throughout the 

day, prompting her to come to the ED for further evaluation.  Patient reports 

her  had similar symptoms last week for which his PMD prescribed him 

medication.  Pt denies increasing weakness and lethargy, cough and sore throat. 

Denies chest pain, SOB, nausea, constipation, dysuria.


Of not: pt was recently admitted on 3/21 for FB removal of esophagus for which 

she underwent an EGD





pmhx: HTN, DM2


pshx: EGD, hysterectomy, right breast cystectomy


meds: norvasc 10mg po, metformin 850mg po bid


allergies: NKDA


sochx: denies; lives with 


famhx: cardiac disease, DM2, parkinson's, alzheimer's








Present on Admission





- Present on Admission


Any Indicators Present on Admission: No





Review of Systems





- Constitutional


Constitutional: Chills, Fatigue, Fever, Lethargy, Weakness





- EENT


Eyes: absent: Blurred Vision


Nose/Mouth/Throat: Sore Throat





- Cardiovascular


Cardiovascular: absent: Chest Pain, Leg Edema, Palpitations





- Respiratory


Respiratory: Cough.  absent: Dyspnea, Hemoptysis





- Gastrointestinal


Gastrointestinal: absent: Abdominal Pain, Constipation, Nausea





- Genitourinary


Genitourinary: Urinary Incontinence (chronic leakage).  absent: Dysuria, Urinary

Hesitance





- Neurological


Neurological: absent: Dizziness, Headaches, Syncope





Past Patient History





- Past Social History


Smoking Status: Never Smoked





- CARDIAC


Hx Hypercholesterolemia: Yes


Hx Hypertension: Yes





- ENDOCRINE/METABOLIC


Hx Endocrine Disorders: Yes


Hx Diabetes Mellitus Type 2: Yes





- PSYCHIATRIC


Hx Substance Use: No





- SURGICAL HISTORY


Hx Surgeries: Yes


Hx Hysterectomy: Yes (Partial)





- ANESTHESIA


Hx Anesthesia: Yes


Hx Anesthesia Reactions: No





Meds


Allergies/Adverse Reactions: 


                                    Allergies











Allergy/AdvReac Type Severity Reaction Status Date / Time


 


No Known Allergies Allergy   Verified 03/29/19 12:38














Physical Exam





- Constitutional


Appears: Non-toxic, No Acute Distress





- Head Exam


Head Exam: ATRAUMATIC, NORMAL INSPECTION, NORMOCEPHALIC





- Eye Exam


Eye Exam: EOMI, Normal appearance





- ENT Exam


ENT Exam: Mucous Membranes Moist, Normal Exam, TM's Normal Bilaterally





- Neck Exam


Neck exam: Positive for: Normal Inspection





- Respiratory Exam


Respiratory Exam: Clear to Auscultation Bilateral, NORMAL BREATHING PATTERN.  

absent: Rales, Respiratory Distress





- Cardiovascular Exam


Cardiovascular Exam: REGULAR RHYTHM, +S1, +S2.  absent: Tachycardia





- GI/Abdominal Exam


GI & Abdominal Exam: Normal Bowel Sounds, Soft.  absent: Distended, Tenderness





- Extremities Exam


Extremities exam: Positive for: normal inspection.  Negative for: calf tendernes

s, pedal edema





- Neurological Exam


Neurological exam: Alert, Oriented x3





- Psychiatric Exam


Psychiatric exam: Normal Affect, Normal Mood





- Skin


Skin Exam: Dry, Intact, Normal Color, Warm





Results





- Vital Signs


Recent Vital Signs: 





                                Last Vital Signs











Temp  99.7 F H  03/29/19 14:52


 


Pulse  76   03/29/19 14:52


 


Resp  18   03/29/19 14:52


 


BP  120/53 L  03/29/19 14:52


 


Pulse Ox  95   03/29/19 14:52














- Labs


Result Diagrams: 


                                 03/29/19 13:11





                                 03/29/19 13:11


Labs: 





                         Laboratory Results - last 24 hr











  03/29/19 03/29/19 03/29/19





  12:42 13:11 13:11


 


WBC   7.1 


 


RBC   3.81 


 


Hgb   11.9 


 


Hct   34.5 


 


MCV   90.5 


 


MCH   31.3 H 


 


MCHC   34.6 


 


RDW   13.6 


 


Plt Count   224 


 


MPV   7.7 


 


Neut % (Auto)   78.9 H 


 


Lymph % (Auto)   6.4 L 


 


Mono % (Auto)   13.1 H 


 


Eos % (Auto)   0.9 


 


Baso % (Auto)   0.7 


 


Neut # (Auto)   5.6 


 


Lymph # (Auto)   0.5 L 


 


Mono # (Auto)   0.9 H 


 


Eos # (Auto)   0.1 


 


Baso # (Auto)   0.0 


 


Neutrophils % (Manual)   82 H 


 


Band Neutrophils %   1 


 


Lymphocytes % (Manual)   6 L 


 


Monocytes % (Manual)   10 


 


Eosinophils % (Manual)   1 


 


Platelet Estimate   Normal 


 


Ovalocytes   Slight 


 


Sodium    132


 


Potassium    4.0


 


Chloride    98


 


Carbon Dioxide    22


 


Anion Gap    15


 


BUN    15


 


Creatinine    0.7


 


Est GFR ( Amer)    > 60


 


Est GFR (Non-Af Amer)    > 60


 


POC Glucose (mg/dL)  183 H  


 


Random Glucose    166 H


 


Calcium    9.4


 


Total Bilirubin    0.3


 


AST    32


 


ALT    17


 


Alkaline Phosphatase    107


 


Total Protein    7.1


 


Albumin    4.5


 


Globulin    2.6


 


Albumin/Globulin Ratio    1.7


 


Urine Color   


 


Urine Clarity   


 


Urine pH   


 


Ur Specific Gravity   


 


Urine Protein   


 


Urine Glucose (UA)   


 


Urine Ketones   


 


Urine Blood   


 


Urine Nitrate   


 


Urine Bilirubin   


 


Urine Urobilinogen   


 


Ur Leukocyte Esterase   


 


Urine WBC (Auto)   


 


Urine RBC (Auto)   


 


Ur Squamous Epith Cells   


 


Urine Bacteria   


 


Influenza Typ A,B (EIA)   














  03/29/19 03/29/19





  13:18 13:28


 


WBC  


 


RBC  


 


Hgb  


 


Hct  


 


MCV  


 


MCH  


 


MCHC  


 


RDW  


 


Plt Count  


 


MPV  


 


Neut % (Auto)  


 


Lymph % (Auto)  


 


Mono % (Auto)  


 


Eos % (Auto)  


 


Baso % (Auto)  


 


Neut # (Auto)  


 


Lymph # (Auto)  


 


Mono # (Auto)  


 


Eos # (Auto)  


 


Baso # (Auto)  


 


Neutrophils % (Manual)  


 


Band Neutrophils %  


 


Lymphocytes % (Manual)  


 


Monocytes % (Manual)  


 


Eosinophils % (Manual)  


 


Platelet Estimate  


 


Ovalocytes  


 


Sodium  


 


Potassium  


 


Chloride  


 


Carbon Dioxide  


 


Anion Gap  


 


BUN  


 


Creatinine  


 


Est GFR ( Amer)  


 


Est GFR (Non-Af Amer)  


 


POC Glucose (mg/dL)  


 


Random Glucose  


 


Calcium  


 


Total Bilirubin  


 


AST  


 


ALT  


 


Alkaline Phosphatase  


 


Total Protein  


 


Albumin  


 


Globulin  


 


Albumin/Globulin Ratio  


 


Urine Color   Yellow


 


Urine Clarity   Hazy


 


Urine pH   5.0


 


Ur Specific Gravity   1.015


 


Urine Protein   2+ H


 


Urine Glucose (UA)   Normal


 


Urine Ketones   Negative


 


Urine Blood   Negative


 


Urine Nitrate   Positive H


 


Urine Bilirubin   Negative


 


Urine Urobilinogen   Normal


 


Ur Leukocyte Esterase   Trace


 


Urine WBC (Auto)   38 H


 


Urine RBC (Auto)   2


 


Ur Squamous Epith Cells   1


 


Urine Bacteria   Many H


 


Influenza Typ A,B (EIA)  Pos for influenza a H 














Assessment & Plan





- Assessment and Plan (Free Text)


Assessment: 





74 year old female admitted for treatment of Influenza A


Plan: 





Influenza A+


febrile on admission 102.7


tachy on admission 108


no leukocytosis


Tamiflu x5 days


NS @100


Tylenol 650mg po q6 prn fevers


f/u CXR





UTI, suspected


pt is asymptomatic


UA: nitrates +, WBC 38, many bacteria


f/u urine cx


start macrobid 100mg po q12





HTN


Continue home meds norvasc 10mg po qd


HHD





DM2


continue home meds, metformin 850mg po BID


accuchecks ACHS


hypoglycemia protocol





Ppx


VTE: heparin q8


GI: protonix 40mg po qd


isolation precaution(droplet)





Discussed with Dr. Jen Kate, PGY-1

## 2019-03-29 NOTE — RAD
Date of service: 



03/29/2019



HISTORY:

 SOB 



COMPARISON:

3/21/2019



TECHNIQUE:

Chest PA and lateral views



FINDINGS:



LUNGS:

No active pulmonary disease.



PLEURA:

No significant pleural effusion identified. No pneumothorax apparent.



CARDIOVASCULAR:

No aortic atherosclerotic calcification present.



Normal cardiac size. No pulmonary vascular congestion. 



OSSEOUS STRUCTURES:

No significant abnormalities.



VISUALIZED UPPER ABDOMEN:

Normal.



OTHER FINDINGS:

None.



IMPRESSION:

No active disease.

## 2019-03-30 LAB
ALBUMIN SERPL-MCNC: 4.3 G/DL (ref 3.5–5)
ALBUMIN/GLOB SERPL: 1.5 {RATIO} (ref 1–2.1)
ALT SERPL-CCNC: 22 U/L (ref 9–52)
AST SERPL-CCNC: 37 U/L (ref 14–36)
BASOPHILS # BLD AUTO: 0.1 K/UL (ref 0–0.2)
BASOPHILS NFR BLD: 0.7 % (ref 0–2)
BUN SERPL-MCNC: 11 MG/DL (ref 7–17)
CALCIUM SERPL-MCNC: 9.3 MG/DL (ref 8.6–10.4)
EOSINOPHIL # BLD AUTO: 0 K/UL (ref 0–0.7)
EOSINOPHIL NFR BLD: 0.5 % (ref 0–4)
ERYTHROCYTE [DISTWIDTH] IN BLOOD BY AUTOMATED COUNT: 13.8 % (ref 11.5–14.5)
GFR NON-AFRICAN AMERICAN: > 60
HGB BLD-MCNC: 12.4 G/DL (ref 11–16)
LYMPHOCYTES # BLD AUTO: 1.7 K/UL (ref 1–4.3)
LYMPHOCYTES NFR BLD AUTO: 21.4 % (ref 20–40)
MCH RBC QN AUTO: 31.1 PG (ref 27–31)
MCHC RBC AUTO-ENTMCNC: 34.2 G/DL (ref 33–37)
MCV RBC AUTO: 90.8 FL (ref 81–99)
MONOCYTES # BLD: 1.2 K/UL (ref 0–0.8)
MONOCYTES NFR BLD: 15.1 % (ref 0–10)
NEUTROPHILS # BLD: 5 K/UL (ref 1.8–7)
NEUTROPHILS NFR BLD AUTO: 62.3 % (ref 50–75)
NRBC BLD AUTO-RTO: 0 % (ref 0–2)
PLATELET # BLD: 239 K/UL (ref 130–400)
PMV BLD AUTO: 7.6 FL (ref 7.2–11.7)
RBC # BLD AUTO: 3.98 MIL/UL (ref 3.8–5.2)
WBC # BLD AUTO: 8 K/UL (ref 4.8–10.8)

## 2019-03-30 RX ADMIN — GUAIFENESIN SCH MG: 600 TABLET, EXTENDED RELEASE ORAL at 17:39

## 2019-03-30 RX ADMIN — PANTOPRAZOLE SODIUM SCH MG: 40 TABLET, DELAYED RELEASE ORAL at 09:43

## 2019-03-30 NOTE — CP.PCM.PN
Subjective





- Date & Time of Evaluation


Date of Evaluation: 03/30/19


Time of Evaluation: 07:55





- Subjective


Subjective: 








Medicine Progress Note:





Patient seen and examined at bedside. Per nursing no acute events occurred 

overnight .Patient denies any fevers, chills, headaches, dizziness, abdominal 

pain, or any other complaints.





Objective





- Vital Signs/Intake and Output


Vital Signs (last 24 hours): 


                                        











Temp Pulse Resp BP Pulse Ox


 


 98.5 F   73   20   134/58 L  95 


 


 03/30/19 07:10  03/30/19 07:10  03/30/19 07:10  03/30/19 07:10  03/30/19 07:10








Intake and Output: 


                                        











 03/30/19 03/30/19





 06:59 18:59


 


Intake Total 1040 


 


Balance 1040 














- Medications


Medications: 


                               Current Medications





Acetaminophen (Tylenol 325mg Tab)  650 mg PO Q6 PRN


   PRN Reason: Fever >100.4 F


   Last Admin: 03/30/19 00:21 Dose:  650 mg


Amlodipine Besylate (Norvasc)  10 mg PO DAILY [facility]


   Last Admin: 03/29/19 14:56 Dose:  Not Given


Dextrose (Dextrose 50% Inj)  0 ml IV STAT PRN; Protocol


   PRN Reason: Hypoglycemia Protocol


Dextrose (Glutose 15)  0 gm PO ONCE PRN; Protocol


   PRN Reason: Hypoglycemia Protocol


Glucagon (Glucagen Diagnostic Kit)  0 mg IM STAT PRN; Protocol


   PRN Reason: Hypoglycemia Protocol


Heparin Sodium (Porcine) (Heparin)  5,000 units SC Q8 [facility]


   Last Admin: 03/30/19 06:15 Dose:  Not Given


Sodium Chloride (Sodium Chloride 0.9%)  1,000 mls @ 100 mls/hr IV .Q10H [facility]


   Last Admin: 03/30/19 06:05 Dose:  100 mls/hr


Dextrose (Dextrose 5% In Water 1000 Ml)  1,000 mls @ 0 mls/hr IV .Q0M PRN; 

Protocol


   PRN Reason: Hypoglycemia Protocol


Metformin HCl (Glucophage)  850 mg PO BID [facility]


   Last Admin: 03/29/19 18:46 Dose:  850 mg


Nitrofurantoin Macrocrystals (Macrobid)  100 mg PO Q12H [facility]; Protocol


   Last Admin: 03/30/19 06:04 Dose:  100 mg


Oseltamivir Phosphate (Tamiflu Cap)  75 mg PO BID [facility]; Protocol


   Stop: 04/03/19 14:39


   Last Admin: 03/29/19 18:46 Dose:  75 mg


Pantoprazole Sodium (Protonix Ec Tab)  40 mg PO DAILY [facility]











- Labs


Labs: 


                                        





                                 03/29/19 13:11 





                                 03/29/19 13:11 











- Head Exam


Head Exam: ATRAUMATIC, NORMAL INSPECTION





- Eye Exam


Eye Exam: EOMI, Normal appearance, PERRL


Pupil Exam: NORMAL ACCOMODATION, PERRL





- ENT Exam


ENT Exam: Mucous Membranes Moist, Normal Oropharynx





- Respiratory Exam


Respiratory Exam: Clear to Ausculation Bilateral, NORMAL BREATHING PATTERN.  

absent: Decreased Breath Sounds, Prolonged Expiratory Phase





- Cardiovascular Exam


Cardiovascular Exam: REGULAR RHYTHM, +S1, +S2





- GI/Abdominal Exam


GI & Abdominal Exam: Soft, Normal Bowel Sounds.  absent: Hyperactive Bowel 

Sounds





- Extremities Exam


Extremities Exam: Full ROM, Normal Inspection.  absent: Pedal Edema





- Back Exam


Back Exam: NORMAL INSPECTION.  absent: CVA tenderness (R), paraspinal tenderness





- Neurological Exam


Neurological Exam: Alert, Awake, CN II-XII Intact, Oriented x3





- Psychiatric Exam


Psychiatric exam: Normal Affect, Normal Mood.  absent: Anxious





- Skin


Skin Exam: Dry, Intact





Assessment and Plan





- Assessment and Plan (Free Text)


Plan: 





74 year old female admitted for treatment of Influenza A


Plan: 





Influenza A+


febrile on admission 102.7


tachy on admission 108


no leukocytosis


Tamiflu x5 days


Mucinex 600 PO BID


Duoneb 3ml RQ6 PRN


NS @100


Tylenol 650mg po q6 prn fevers


f/u CXR





UTI, suspected


pt is asymptomatic


UA: nitrates +, WBC 38, many bacteria


f/u urine cx


Continue macrobid 100mg po q12





HTN


Continue home meds norvasc 10mg po qd


HHD





DM2


continue home meds, metformin 850mg po BID


accuchecks ACHS


hypoglycemia protocol





Ppx


VTE: heparin q8


GI: protonix 40mg po qd


isolation precaution(droplet)





Discussed with Dr. Jen Ely, PGY-2

## 2019-03-31 VITALS
HEART RATE: 69 BPM | TEMPERATURE: 98.1 F | SYSTOLIC BLOOD PRESSURE: 119 MMHG | OXYGEN SATURATION: 95 % | DIASTOLIC BLOOD PRESSURE: 63 MMHG

## 2019-03-31 LAB
ALBUMIN SERPL-MCNC: 3.6 G/DL (ref 3.5–5)
ALBUMIN/GLOB SERPL: 1.4 {RATIO} (ref 1–2.1)
ALT SERPL-CCNC: 24 U/L (ref 9–52)
AST SERPL-CCNC: 33 U/L (ref 14–36)
BASOPHILS # BLD AUTO: 0 K/UL (ref 0–0.2)
BASOPHILS NFR BLD: 0.9 % (ref 0–2)
BUN SERPL-MCNC: 7 MG/DL (ref 7–17)
CALCIUM SERPL-MCNC: 8.9 MG/DL (ref 8.6–10.4)
EOSINOPHIL # BLD AUTO: 0.2 K/UL (ref 0–0.7)
EOSINOPHIL NFR BLD: 4.3 % (ref 0–4)
ERYTHROCYTE [DISTWIDTH] IN BLOOD BY AUTOMATED COUNT: 13.5 % (ref 11.5–14.5)
GFR NON-AFRICAN AMERICAN: > 60
HGB BLD-MCNC: 11.3 G/DL (ref 11–16)
LYMPHOCYTES # BLD AUTO: 1.9 K/UL (ref 1–4.3)
LYMPHOCYTES NFR BLD AUTO: 34.6 % (ref 20–40)
MCH RBC QN AUTO: 31.1 PG (ref 27–31)
MCHC RBC AUTO-ENTMCNC: 34.6 G/DL (ref 33–37)
MCV RBC AUTO: 90 FL (ref 81–99)
MONOCYTES # BLD: 0.7 K/UL (ref 0–0.8)
MONOCYTES NFR BLD: 12.6 % (ref 0–10)
NEUTROPHILS # BLD: 2.7 K/UL (ref 1.8–7)
NEUTROPHILS NFR BLD AUTO: 47.6 % (ref 50–75)
NRBC BLD AUTO-RTO: 0 % (ref 0–2)
PLATELET # BLD: 211 K/UL (ref 130–400)
PMV BLD AUTO: 7.9 FL (ref 7.2–11.7)
RBC # BLD AUTO: 3.65 MIL/UL (ref 3.8–5.2)
WBC # BLD AUTO: 5.6 K/UL (ref 4.8–10.8)

## 2019-03-31 RX ADMIN — PANTOPRAZOLE SODIUM SCH MG: 40 TABLET, DELAYED RELEASE ORAL at 09:40

## 2019-03-31 RX ADMIN — GUAIFENESIN SCH MG: 600 TABLET, EXTENDED RELEASE ORAL at 09:39

## 2019-03-31 RX ADMIN — GUAIFENESIN SCH MG: 600 TABLET, EXTENDED RELEASE ORAL at 17:16

## 2019-03-31 NOTE — CP.PCM.DIS
Provider





- Provider


Date of Admission: 


19 13:55





Attending physician: 


Farhad Li Jr, MD





Time Spent in preparation of Discharge (in minutes): 45





Hospital Course





- Lab Results


Lab Results: 


                                  Micro Results





19 14:17   Urine,Clean Catch   Urine Culture - Final


                            10-50,000 CFU/ML.


                            MULTIPLE SPECIES. PROBABLE CONTAMINATION.





                             Most Recent Lab Values











WBC  5.6 K/uL (4.8-10.8)   19  09:11    


 


RBC  3.65 Mil/uL (3.80-5.20)  L  19  09:11    


 


Hgb  11.3 g/dL (11.0-16.0)   19  09:11    


 


Hct  32.8 % (34.0-47.0)  L  19  09:11    


 


MCV  90.0 fL (81.0-99.0)   19  09:11    


 


MCH  31.1 pg (27.0-31.0)  H  19  09:11    


 


MCHC  34.6 g/dL (33.0-37.0)   19  09:11    


 


RDW  13.5 % (11.5-14.5)   19  09:11    


 


Plt Count  211 K/uL (130-400)   19  09:11    


 


MPV  7.9 fL (7.2-11.7)   19  09:11    


 


Neut % (Auto)  47.6 % (50.0-75.0)  L  19  09:11    


 


Lymph % (Auto)  34.6 % (20.0-40.0)   19  09:11    


 


Mono % (Auto)  12.6 % (0.0-10.0)  H  19  09:11    


 


Eos % (Auto)  4.3 % (0.0-4.0)  H  19  09:11    


 


Baso % (Auto)  0.9 % (0.0-2.0)   19  09:11    


 


Neut # (Auto)  2.7 K/uL (1.8-7.0)   19  09:11    


 


Lymph # (Auto)  1.9 K/uL (1.0-4.3)   19  09:11    


 


Mono # (Auto)  0.7 K/uL (0.0-0.8)   19  09:11    


 


Eos # (Auto)  0.2 K/uL (0.0-0.7)   19  09:11    


 


Baso # (Auto)  0.0 K/uL (0.0-0.2)   19  09:11    


 


Neutrophils % (Manual)  82 % (50-75)  H  19  13:11    


 


Band Neutrophils %  1 % (0-2)   19  13:11    


 


Lymphocytes % (Manual)  6 % (20-40)  L  19  13:11    


 


Monocytes % (Manual)  10 % (0-10)   19  13:11    


 


Eosinophils % (Manual)  1 % (0-4)   19  13:11    


 


Platelet Estimate  Normal  (NORMAL)   19  13:11    


 


Ovalocytes  Slight   19  13:11    


 


Sodium  137 mmol/L (132-148)   19  09:11    


 


Potassium  3.7 mmol/L (3.6-5.2)   19  09:11    


 


Chloride  107 mmol/L ()   19  09:11    


 


Carbon Dioxide  24 mmol/L (22-30)   19  09:11    


 


Anion Gap  9  (10-20)  L  19  09:11    


 


BUN  7 mg/dL (7-17)   19  09:11    


 


Creatinine  0.6 mg/dL (0.7-1.2)  L  19  09:11    


 


Est GFR ( Amer)  > 60   19  09:11    


 


Est GFR (Non-Af Amer)  > 60   19  09:11    


 


POC Glucose (mg/dL)  244 mg/dL ()  H  19  11:02    


 


Random Glucose  125 mg/dL ()  H  19  09:11    


 


Calcium  8.9 mg/dl (8.6-10.4)   19  09:11    


 


Total Bilirubin  0.2 mg/dL (0.2-1.3)   19  09:11    


 


AST  33 U/L (14-36)   19  09:11    


 


ALT  24 U/L (9-52)   19  09:11    


 


Alkaline Phosphatase  85 U/L ()   19  09:11    


 


Total Protein  6.2 g/dL (6.3-8.3)  L  19  09:11    


 


Albumin  3.6 g/dL (3.5-5.0)   19  09:11    


 


Globulin  2.6 gm/dL (2.2-3.9)   19  09:11    


 


Albumin/Globulin Ratio  1.4  (1.0-2.1)   19  09:11    


 


Urine Color  Yellow  (YELLOW)   19  13:28    


 


Urine Clarity  Hazy  (Clear)   19  13:28    


 


Urine pH  5.0  (5.0-8.0)   19  13:28    


 


Ur Specific Gravity  1.015  (1.003-1.030)   19  13:28    


 


Urine Protein  2+ mg/dL (NEGATIVE)  H  19  13:28    


 


Urine Glucose (UA)  Normal mg/dL (Normal)   19  13:28    


 


Urine Ketones  Negative mg/dL (NEGATIVE)   19  13:28    


 


Urine Blood  Negative  (NEGATIVE)   19  13:28    


 


Urine Nitrate  Positive  (NEGATIVE)  H  19  13:28    


 


Urine Bilirubin  Negative  (NEGATIVE)   19  13:28    


 


Urine Urobilinogen  Normal mg/dL (0.2-1.0)   19  13:28    


 


Ur Leukocyte Esterase  Trace Gretchen/uL (Negative)   19  13:28    


 


Urine WBC (Auto)  38 /hpf (0-5)  H  19  13:28    


 


Urine RBC (Auto)  2 /hpf (0-3)   19  13:28    


 


Ur Squamous Epith Cells  1 /hpf (0-5)   19  13:28    


 


Urine Bacteria  Many  (<OCC)  H  19  13:28    


 


Influenza Typ A,B (EIA)  Pos for influenza a  (NEGATIVE)  H  19  13:18    














- Hospital Course


Hospital Course: 








Patient is a 74 year old female with pmhx of DM2 and HTN who presented to the ED

with complaints of fevers, weakness, coughing, sore throat that began last 

night.  Patient reports symptoms came on abruptly, and worsened throughout the 

day, prompting her to come to the ED for further evaluation.  Patient reports 

her  had similar symptoms last week for which his PMD prescribed him 

medication.  Pt denies increasing weakness and lethargy, cough and sore throat. 

Denies chest pain, SOB, nausea, constipation, dysuria.


Of not: pt was recently admitted on 3/21 for FB removal of esophagus for which 

she underwent an EGD





pmhx: HTN, DM2


pshx: EGD, hysterectomy, right breast cystectomy


meds: norvasc 10mg po, metformin 850mg po bid


allergies: NKDA


sochx: denies; lives with 


famhx: cardiac disease, DM2, parkinson's, alzheimer's








Hospital Course:


While admitted patient was found to be influenza positive. Patient started on 

Tamiflu as a result. Patient also found to have UTI.  Patient started on PO 

Antibiotics. Patient started on home medications for hypertension and diabetes. 

Patient evaluated at today's visit and symptoms improving. Patient remains 

afebrile and without leukocytosis. Patient tolerating diet without any issues. 

Patient discharged.








Imagin. Chest xray: no active disease








Discharge Instructions:


1.F/u with PMD Dr. Li within 5 days of discharge.


2. Return to hospital for any new or worsening symtpoms.





Medications:


1.Tamilflu 75 mg PO BID, #6, No refills


2. Macrobid 100mg PO BID, #8, No refills.


3. Metformin 850mg PO BID, #60, No refills


4.Norvasc 10mg PO DAILY, #30, No refills


5.Mucinex 600mg PO BID, #30, No refills





Discharge Exam





- Head Exam


Head Exam: ATRAUMATIC, NORMAL INSPECTION





- Eye Exam


Eye Exam: EOMI, Normal appearance, PERRL


Pupil Exam: NORMAL ACCOMODATION





- ENT Exam


ENT Exam: Mucous Membranes Moist, Normal Oropharynx





- Respiratory Exam


Respiratory Exam: Clear to PA & Lateral, NORMAL BREATHING PATTERN, UNREMARKABLE.

 absent: Rales





- Cardiovascular Exam


Cardiovascular Exam: REGULAR RHYTHM, +S1, +S2





- GI/Abdominal Exam


GI & Abdominal Exam: Normal Bowel Sounds, Unremarkable.  absent: Hypoactive 

Bowel Sounds, Organomegaly





- Neurological Exam


Neurological exam: Alert, CN II-XII Intact, Oriented x3





- Psychiatric Exam


Psychiatric exam: Normal Affect, Normal Mood





- Skin


Skin Exam: Dry, Intact





Discharge Plan





- Discharge Medications


Prescriptions: 


amLODIPine [Norvasc] 10 mg PO DAILY #30 tab


Guaifenesin [Mucinex ER] 600 mg PO BID #30 ter


metFORMIN [glucOPHAGE] 850 mg PO BID #60 tab


Nitrofurantoin Macrocrystals [Macrobid] 100 mg PO BID #8 cap


Oseltamivir Phosphate [Tamiflu] 75 mg PO BID #6 capsule





- Follow Up Plan


Condition: STABLE


Disposition: HOME/ ROUTINE

## 2019-03-31 NOTE — CP.PCM.PN
Subjective





- Date & Time of Evaluation


Date of Evaluation: 03/31/19


Time of Evaluation: 08:16





- Subjective


Subjective: 





Medicine Progress Note:





Patient seen and examined at bedside. Per nursing no acute events occurred 

overnight .Patient denies any fevers, chills, headaches, dizziness, abdominal 

pain, or any other complaints.








Objective





- Vital Signs/Intake and Output


Vital Signs (last 24 hours): 


                                        











Temp Pulse Resp BP Pulse Ox


 


 98.1 F   80   20   120/58 L  95 


 


 03/30/19 23:25  03/30/19 23:25  03/30/19 23:25  03/30/19 23:25  03/30/19 23:25











- Medications


Medications: 


                               Current Medications





Acetaminophen (Tylenol 325mg Tab)  650 mg PO Q6 PRN


   PRN Reason: Fever >100.4 F


   Last Admin: 03/30/19 00:21 Dose:  650 mg


Albuterol/Ipratropium (Duoneb 3 Mg/0.5 Mg (3 Ml) Ud)  3 ml INH RQ6 PRN


   PRN Reason: Shortness of Breath


Amlodipine Besylate (Norvasc)  10 mg PO DAILY [facility]


   Last Admin: 03/30/19 09:42 Dose:  10 mg


Dextrose (Dextrose 50% Inj)  0 ml IV STAT PRN; Protocol


   PRN Reason: Hypoglycemia Protocol


Dextrose (Glutose 15)  0 gm PO ONCE PRN; Protocol


   PRN Reason: Hypoglycemia Protocol


Glucagon (Glucagen Diagnostic Kit)  0 mg IM STAT PRN; Protocol


   PRN Reason: Hypoglycemia Protocol


Guaifenesin (Mucinex La)  600 mg PO BID [facility]


   Last Admin: 03/30/19 17:39 Dose:  600 mg


Heparin Sodium (Porcine) (Heparin)  5,000 units SC Q8 [facility]


   Last Admin: 03/31/19 06:48 Dose:  5,000 units


Sodium Chloride (Sodium Chloride 0.9%)  1,000 mls @ 100 mls/hr IV .Q10H [facility]


   Last Admin: 03/30/19 06:05 Dose:  100 mls/hr


Dextrose (Dextrose 5% In Water 1000 Ml)  1,000 mls @ 0 mls/hr IV .Q0M PRN; 

Protocol


   PRN Reason: Hypoglycemia Protocol


Metformin HCl (Glucophage)  850 mg PO BID [facility]


   Last Admin: 03/30/19 17:39 Dose:  850 mg


Nitrofurantoin Macrocrystals (Macrobid)  100 mg PO Q12H [facility]; Protocol


   Last Admin: 03/31/19 06:48 Dose:  100 mg


Oseltamivir Phosphate (Tamiflu Cap)  75 mg PO BID [facility]; Protocol


   Stop: 04/03/19 14:39


   Last Admin: 03/30/19 17:40 Dose:  75 mg


Pantoprazole Sodium (Protonix Ec Tab)  40 mg PO DAILY [facility]


   Last Admin: 03/30/19 09:43 Dose:  40 mg











- Labs


Labs: 


                                        





                                 03/30/19 08:47 





                                 03/30/19 08:47 











- Head Exam


Head Exam: ATRAUMATIC, NORMAL INSPECTION





- Eye Exam


Eye Exam: EOMI, Normal appearance, PERRL


Pupil Exam: NORMAL ACCOMODATION, PERRL.  absent: Irregular, Unequal





- ENT Exam


ENT Exam: Mucous Membranes Moist, Normal Oropharynx





- Respiratory Exam


Respiratory Exam: Clear to Ausculation Bilateral, NORMAL BREATHING PATTERN.  

absent: Prolonged Expiratory Phase, Respiratory Distress





- Cardiovascular Exam


Cardiovascular Exam: REGULAR RHYTHM, +S1, +S2





- GI/Abdominal Exam


GI & Abdominal Exam: Soft, Normal Bowel Sounds.  absent: Hyperactive Bowel 

Sounds





- Extremities Exam


Extremities Exam: Full ROM, Normal Inspection.  absent: Pedal Edema





- Back Exam


Back Exam: NORMAL INSPECTION.  absent: paraspinal tenderness





- Neurological Exam


Neurological Exam: Alert, Awake, CN II-XII Intact, Oriented x3





- Psychiatric Exam


Psychiatric exam: Normal Affect, Normal Mood.  absent: Depressed





- Skin


Skin Exam: Dry, Intact, Normal Color





Assessment and Plan





- Assessment and Plan (Free Text)


Plan: 








74 year old female admitted for treatment of Influenza A


Plan: 





Influenza A+


febrile on admission 102.7


tachy on admission 108


no leukocytosis


Tamiflu x5 days


Mucinex 600 PO BID


Duoneb 3ml RQ6 PRN


NS @100


Tylenol 650mg po q6 prn fevers


f/u CXR





UTI, suspected


pt is asymptomatic


UA: nitrates +, WBC 38, many bacteria


f/u urine cx


Continue macrobid 100mg po q12





HTN


Continue home meds norvasc 10mg po qd


HHD





DM2


continue home meds, metformin 850mg po BID


accuchecks ACHS


hypoglycemia protocol





Ppx


VTE: heparin q8


GI: protonix 40mg po qd


isolation precaution(droplet)





Discussed with Dr. Jen Ely, PGY-2

## 2021-05-25 NOTE — C.PDOC
Patient Education     Self-Care for Low Back Pain    Most people have low back pain now and then. In many cases, it isn’t serious and self-care can help. Sometimes low back pain can be a sign of a bigger problem. Call your healthcare provider if your pain returns often or gets worse over time. For the long-term care of your back, get regular exercise, lose any excess weight and learn good posture.  Take a short rest  Lying down during the day may be helpful for short periods of time if severe pain increases with sitting or standing. Long-term bed rest could be damaging.  Reduce pain and swelling  Cold reduces swelling. Both cold and heat can reduce pain. Protect your skin by placing a towel between your body and the ice or heat source.  · For the first few days, apply an ice pack for 15 to 20 minutes, several times a day. To make a cold pack, put ice cubes in a plastic bag that seals at the top.  · After the first few days, try heat for 15 minutes at a time to ease pain. Never sleep on a heating pad.  · Over-the-counter medicine can help control pain and swelling. Try aspirin or a non-steroidal anti-inflammatory medicine (NSAID) such as ibuprofen.  Exercise  Exercise can help your back heal. It also helps your back get stronger and more flexible, preventing any reinjury. Ask your healthcare provider about specific exercises for your back.  Use good posture to avoid reinjury  · When moving, bend at the hips and knees. Don’t bend at the waist or twist around.  · When lifting, keep the object close to your body. Lift heavy items using your legs, not your back. Don’t try to lift more than you can handle.  · When sitting, keep your lower back supported. Use a rolled-up towel as needed.  When to call your healthcare provider  Seek medical care right away if:  · You can't stand or walk.  · You have a temperature over 100.4°F (38.0°C)  · You have frequent, painful, or bloody urination.  · You have severe abdominal  History Of Present Illness


The patient comes in complaining of fever, body aches, cough and sore throat 

since yesterday. Patient states she went to see her doctor today who sent her to

the ED for further testing. Per chart she had Rapid strep and flu tests which 

were negative. Patient has fever of 102F and elevated heart rate. Patient denies

headache, dizziness, SOB, chest pain, abdominal pain, vomiting or diarrhea. 








HPI: Influenza


Time Seen by Provider: 03/29/19 12:37


Chief Complaint: Flu-like Symptoms


Chief Complaint (Provider): Flu-like Symptoms


History Per: Patient


Exam Limitations: no limitations


Onset/Duration Of Symptoms: Days


Symptoms include: fever, bodyaches, sore throat, cough


Risk factors for flu complications: Yes: adult > 65 years





Past Medical History


Reviewed: Historical Data, Nursing Documentation, Vital Signs


Vital Signs: 





                                Last Vital Signs











Temp  102.7 F H  03/29/19 12:31


 


Pulse  108 H  03/29/19 12:31


 


Resp  18   03/29/19 12:31


 


BP  169/77 H  03/29/19 12:31


 


Pulse Ox  98   03/29/19 12:31














- Medical History


PMH: Diabetes, HTN, Hypercholesterolemia





- [facility] Procedures











CYSTOSCOPY NEC (08/12/13)


D & C NEC (07/08/13)


LAPAROSCOP SUPRACERVICAL HYSTERECTOMY (LSH) (08/12/13)








Family History: States: Unknown Family Hx





- Social History


Hx Alcohol Use: No


Hx Substance Use: No





- Immunization History


Hx Tetanus Toxoid Vaccination: No


Hx Influenza Vaccination: Yes


Hx Pneumococcal Vaccination: Yes





Review Of Systems


Except As Marked, All Systems Reviewed And Found Negative.


Constitutional: Positive for: Fever, Other (Body aches)


ENT: Positive for: Throat Pain


Cardiovascular: Negative for: Chest Pain


Respiratory: Positive for: Cough.  Negative for: Shortness of Breath


Gastrointestinal: Negative for: Vomiting, Abdominal Pain, Diarrhea


Neurological: Negative for: Headache, Dizziness





Physical Exam





- Physical Exam


Appears: Well, Non-toxic, No Acute Distress


Skin: Warm, Dry


Head: Atraumatic, Normacephalic


Eye(s): bilateral: Normal Inspection


Oral Mucosa: Moist


Neck: Normal ROM


Chest: Symmetrical


Cardiovascular: Rhythm Regular, No Murmur


Respiratory: Normal Breath Sounds, No Rales, No Rhonchi, No Wheezing


Gastrointestinal/Abdominal: Soft, No Tenderness, No Distention


Extremity: Bilateral: Atraumatic, Normal Color And Temperature


Neurological/Psych: Oriented x3, Normal Speech





Medical Decision Making


Medical Decision Making: 


Impression: Fever, flu like sx


Plan: 


* Labs


* CXR


* UA


* Rapid flu





Progress:


Labs reviewed no leukocytosis or electrolyte abnormality. Rapid Flu was +A. UA 

shows +LE, WBC and nitrates. 


Contact Dr Li to discuss results. He recommends patient be admitted for 

observation to his service. 


I contacted medical resident for admission orders





- Laboratory Results


Result Diagrams: 


                                 03/29/19 13:11





                                 03/29/19 13:11


Lab Results: 





                                        











Total Bilirubin  0.3 mg/dL (0.2-1.3)   03/29/19  13:11    


 


AST  32 U/L (14-36)   03/29/19  13:11    


 


ALT  17 U/L (9-52)   03/29/19  13:11    


 


Alkaline Phosphatase  107 U/L ()   03/29/19  13:11    


 


Total Protein  7.1 g/dL (6.3-8.3)   03/29/19  13:11    


 


Albumin  4.5 g/dL (3.5-5.0)   03/29/19  13:11    


 


Globulin  2.6 gm/dL (2.2-3.9)   03/29/19  13:11    


 


Albumin/Globulin Ratio  1.7  (1.0-2.1)   03/29/19  13:11    








                                        











Urine Color  Yellow  (YELLOW)   03/29/19  13:28    


 


Urine Clarity  Hazy  (Clear)   03/29/19  13:28    


 


Urine pH  5.0  (5.0-8.0)   03/29/19  13:28    


 


Ur Specific Gravity  1.015  (1.003-1.030)   03/29/19  13:28    


 


Urine Protein  2+ mg/dL (NEGATIVE)  H  03/29/19  13:28    


 


Urine Glucose (UA)  Normal mg/dL (Normal)   03/29/19  13:28    


 


Urine Ketones  Negative mg/dL (NEGATIVE)   03/29/19  13:28    


 


Urine Blood  Negative  (NEGATIVE)   03/29/19  13:28    


 


Urine Nitrate  Positive  (NEGATIVE)  H  03/29/19  13:28    


 


Urine Bilirubin  Negative  (NEGATIVE)   03/29/19  13:28    


 


Urine Urobilinogen  Normal mg/dL (0.2-1.0)   03/29/19  13:28    


 


Ur Leukocyte Esterase  Trace Gretchen/uL (Negative)   03/29/19  13:28    


 


Urine WBC (Auto)  38 /hpf (0-5)  H  03/29/19  13:28    


 


Urine RBC (Auto)  2 /hpf (0-3)   03/29/19  13:28    


 


Ur Squamous Epith Cells  1 /hpf (0-5)   03/29/19  13:28    


 


Urine Bacteria  Many  (<OCC)  H  03/29/19  13:28    














- ECG


O2 Sat by Pulse Oximetry: 98


Pulse Ox Interpretation: Normal





- Radiology


X-Ray: Interpreted by Me, Viewed By Me


X-Ray Interpretation: No Acute Disease





Disposition





- Disposition


Disposition: HOSPITALIZED


Disposition Time: 13:57


Condition: STABLE





- POA


Present On Arrival: None





- Clinical Impression


Clinical Impression: 


 Influenza A, UTI (urinary tract infection)








- PA / NP / Resident Statement


MD/DO has reviewed & agrees with the documentation as recorded.





- Scribe Statement


The provider has reviewed the documentation as recorded by the Scribe


Yari Olivarez





All medical record entries made by the Scribe were at my direction and 

personally dictated by me. I have reviewed the chart and agree that the record 

accurately reflects my personal performance of the history, physical exam, 

medical decision making, and the department course for this patient. I have also

personally directed, reviewed, and agree with the discharge instructions and 

disposition.





Decision To Admit





- Pt Status Changed To:


Hospital Disposition Of: Observation





- .


Bed Request Type: Regular


Admitting Physician: Farhad Li Jr.


Patient Diagnosis: 


 Influenza A, UTI (urinary tract infection) pain.  · You have a sharp, stabbing pain.  · Your pain is constant.  · You have pain, tingling, or numbness in your leg.  · You feel pain in a new area of your back.  · You notice that the pain isn’t decreasing after more than a week.   Date Last Reviewed: 3/1/2018  © 9679-8098 [employer]. [address] [place] [address]. All rights reserved. This information is not intended as a substitute for professional medical care. Always follow your healthcare professional's instructions.